# Patient Record
Sex: FEMALE | Race: WHITE | NOT HISPANIC OR LATINO | Employment: UNEMPLOYED | URBAN - METROPOLITAN AREA
[De-identification: names, ages, dates, MRNs, and addresses within clinical notes are randomized per-mention and may not be internally consistent; named-entity substitution may affect disease eponyms.]

---

## 2021-05-14 ENCOUNTER — RECORDS - HEALTHEAST (OUTPATIENT)
Dept: SCHEDULING | Facility: CLINIC | Age: 51
End: 2021-05-14

## 2021-05-14 ENCOUNTER — OFFICE VISIT (OUTPATIENT)
Dept: NEUROLOGY | Facility: CLINIC | Age: 51
End: 2021-05-14
Payer: COMMERCIAL

## 2021-05-14 ENCOUNTER — RECORDS - HEALTHEAST (OUTPATIENT)
Dept: ADMINISTRATIVE | Facility: OTHER | Age: 51
End: 2021-05-14

## 2021-05-14 VITALS
HEIGHT: 62 IN | SYSTOLIC BLOOD PRESSURE: 165 MMHG | WEIGHT: 115 LBS | BODY MASS INDEX: 21.16 KG/M2 | DIASTOLIC BLOOD PRESSURE: 106 MMHG | HEART RATE: 118 BPM

## 2021-05-14 DIAGNOSIS — M54.12 CERVICAL RADICULOPATHY: Primary | ICD-10-CM

## 2021-05-14 DIAGNOSIS — M54.12 CERVICAL RADICULOPATHY: ICD-10-CM

## 2021-05-14 DIAGNOSIS — E53.8 VITAMIN B12 DEFICIENCY (NON ANEMIC): ICD-10-CM

## 2021-05-14 PROBLEM — I10 ESSENTIAL HYPERTENSION: Status: ACTIVE | Noted: 2021-01-07

## 2021-05-14 PROBLEM — Z79.899 CONTROLLED SUBSTANCE AGREEMENT SIGNED: Status: ACTIVE | Noted: 2021-05-14

## 2021-05-14 PROCEDURE — 99205 OFFICE O/P NEW HI 60 MIN: CPT | Performed by: PSYCHIATRY & NEUROLOGY

## 2021-05-14 RX ORDER — ACETAMINOPHEN AND CODEINE PHOSPHATE 120; 12 MG/5ML; MG/5ML
0.35 SOLUTION ORAL DAILY
COMMUNITY
Start: 2021-02-19

## 2021-05-14 RX ORDER — PRAMIPEXOLE DIHYDROCHLORIDE 0.12 MG/1
TABLET ORAL
COMMUNITY
Start: 2021-04-01

## 2021-05-14 RX ORDER — LISINOPRIL 10 MG/1
TABLET ORAL
COMMUNITY
Start: 2021-04-16

## 2021-05-14 RX ORDER — BUPROPION HYDROCHLORIDE 150 MG/1
150 TABLET ORAL DAILY
COMMUNITY
Start: 2021-05-06

## 2021-05-14 RX ORDER — LEVOTHYROXINE, LIOTHYRONINE 57; 13.5 UG/1; UG/1
1 TABLET ORAL DAILY
COMMUNITY
Start: 2021-04-01

## 2021-05-14 RX ORDER — DEXTROAMPHETAMINE SULFATE, DEXTROAMPHETAMINE SACCHARATE, AMPHETAMINE SULFATE AND AMPHETAMINE ASPARTATE 7.5; 7.5; 7.5; 7.5 MG/1; MG/1; MG/1; MG/1
CAPSULE, EXTENDED RELEASE ORAL
COMMUNITY
Start: 2021-04-16

## 2021-05-14 RX ORDER — FERROUS SULFATE 324(65)MG
TABLET, DELAYED RELEASE (ENTERIC COATED) ORAL
COMMUNITY

## 2021-05-14 SDOH — HEALTH STABILITY: MENTAL HEALTH: HOW OFTEN DO YOU HAVE A DRINK CONTAINING ALCOHOL?: MONTHLY OR LESS

## 2021-05-14 SDOH — HEALTH STABILITY: MENTAL HEALTH: HOW OFTEN DO YOU HAVE 6 OR MORE DRINKS ON ONE OCCASION?: NOT ASKED

## 2021-05-14 SDOH — HEALTH STABILITY: MENTAL HEALTH: HOW MANY STANDARD DRINKS CONTAINING ALCOHOL DO YOU HAVE ON A TYPICAL DAY?: 1 OR 2

## 2021-05-14 ASSESSMENT — MIFFLIN-ST. JEOR: SCORE: 1089.89

## 2021-05-14 NOTE — PROGRESS NOTES
NEUROLOGY CONSULTATION NOTE       [facility] [place]  [address] [place] [address]  Tel: [phone]  Fax: [phone]  [email or web address]     Jennifer Skaggs,  1970, MRN [id number]  PCP: Tuyet Bedoya  Date: 2021     ASSESSMENT & PLAN     Diagnosis code  1. Cervical radiculopathy     Cervical radiculopathy  50-year-old female with a recent onset of left arm weakness with inability to lift left arm above shoulder height.  She does have weakness in left C6 innervated muscle and additionally has winging of the left scapula.  I have scheduled her for MRI of the cervical spine and EMG of the left upper extremity.  She was treated with prednisone and currently is not having any pain.  Follow-up will be the day she gets EMG.    Vitamin B12 deficiency  Patient had lab work done 5 months ago and was noted to have a low vitamin B12.  She is not sure if anything was done and reports that she is taking B complex supplements.  I have recommended repeating vitamin B12, methylmalonic acid level, folic acid and homocysteine.  Follow-up will be after above tests    Thank you again for this referral, please feel free to contact me if you have any questions.    George Muir MD  [facility]  (Formerly, [facility]     REASON FOR CONSULTATION Extremity Pain        HISTORY OF PRESENT ILLNESS     We have been requested by Dr. Bedoya to evaluate Jennifer Skaggs who is a 50 year old  female for left arm weakness    Patient is a 50-year-old female with history of restless leg syndrome, hypothyroidism, hypertension, ADD who was referred for evaluation of left, weakness that started 3 weeks ago.  According to patient without any triggering event she started noticing some discomfort in her neck with radiation down into her arm.  She felt somewhat weak in her left arm and was seen in urgent care.  She was told she has her  cervical radiculopathy and treated with prednisone and a muscle relaxant that did seem to help and was told to follow-up with neurology.  Since that episode although her pain has improved, she continues to have some weakness.  She cannot identify any triggers there there is no history of any weakness in the left lower extremity.  She finds it difficult to lift her left arm above the shoulder height.  There is no history of any numbness tingling in the left arm.    Patient had some lab work done more than 5 months ago and her vitamin B12 level was low.  She is somewhat surprised to learn as no one informed her that her vitamin B12 level was low.  She does report that she takes B complex supplements at home.     PROBLEM LIST   Patient Active Problem List   Diagnosis Code     ADD (attention deficit disorder) F98.8     Essential hypertension I10     Controlled substance agreement signed [id number]     Restless legs syndrome (RLS) G25.81     Subclinical hypothyroidism E03.9         PAST MEDICAL & SURGICAL HISTORY     Past Medical History:   Patient  has no past medical history on file.    Surgical History:  She  has no past surgical history on file.     SOCIAL HISTORY     Reviewed, and she  reports that she has never smoked. She has never used smokeless tobacco. She reports current alcohol use.     FAMILY HISTORY     Reviewed, and family history includes Diabetes in her father, maternal grandfather, and mother; Hypertension in her father, maternal grandfather, maternal grandmother, and mother; Lung Cancer in her father; Mental Illness in her mother.     ALLERGIES     No Known Allergies      REVIEW OF SYSTEMS     A 12 point review of system was performed and was negative except as outlined in the history of present illness.     HOME MEDICATIONS     Current Outpatient Rx   Medication Sig Dispense Refill     ADDERALL XR 30 MG 24 hr capsule        buPROPion (WELLBUTRIN XL) 150 MG 24 hr tablet Take 150 mg by mouth daily        "cholecalciferol 50 MCG (2000 UT) tablet Take 2,000 Units by mouth       Ferrous Sulfate 324 (65 Fe) MG TBEC        lisinopril (ZESTRIL) 10 MG tablet        norethindrone (MICRONOR) 0.35 MG tablet Take 0.35 mg by mouth daily       NP THYROID 90 MG tablet Take 1 tablet by mouth daily       pramipexole (MIRAPEX) 0.125 MG tablet TAKE 1 TABLET BY MOUTH EVERYDAY AT BEDTIME       vitamin B complex with vitamin C (VITAMIN  B COMPLEX) tablet Take 1 tablet by mouth daily           PHYSICAL EXAM     Vital signs  BP (!) 165/106 (BP Location: Right arm, Patient Position: Sitting)   Pulse 118   Ht 1.575 m (5' 2\")   Wt 52.2 kg (115 lb)   BMI 21.03 kg/m      Weight:   115 lbs 0 oz    GENERAL PHYSICAL EXAM: Patient is alert and oriented x 4 in no acute distress. Neck was supple, no carotid bruits, thyromegaly, lymphadenopathy or JVD noted.   NEUROLOGICAL EXAM:  Mental Status  Patient is A&O x 4. Patient recalls 3/3 objects at 5 minutes.  Speech  Speech is clear and fluent with good repetition, comprehension, and naming both for objects and parts of an object. Written and verbal comprehension is intact.  Cranial Nerves  CN II: Visual fields are full to confrontation. Fundoscopic exam is normal with sharp discs and no vascular changes. Venous pulsations are present bilaterally. Pupils are equal and reactive to light.   CN III, IV, VI: EOMI, PERRLA  CN V: Facial sensation is intact to pinprick in all 3 divisions bilaterally. Corneal responses are intact.  CN VII: Face is symmetric with normal eye closure and smile.  CN VII: Hearing is normal to rubbing fingers  CN IX, X: Palate elevates symmetrically. Phonation is normal.  CN XI: Head turning and shoulder shrug are intact  CN XII: Tongue is midline with normal movements and no atrophy.  Motor Exam  Muscle bulk and tone are normal. No pronator drift. Strength is 5/5 bilaterally except left deltoid, biceps brachioradialis and infraspinatus are 4/5.  Patient does appear to have " winging of the left scapula  Reflexes  Reflexes 1+ in left biceps, brachioradialis rest are 2+ with downgoing toes  Sensory Exam  Decreased pinprick in left C6 distribution  Coordination  Rapid alternating movements and fine finger movements are intact. No dysmetria on FNF and HKS. Romberg negative.  Gait  Posture is normal.Tandem gait is normal. Able to walk on toes and heels.     DIAGNOSTIC STUDIES     PERTINENT RADIOLOGY  Following imaging studies were reviewed:     No recent imaging studies to review     PERTINENT LABS  Following labs were reviewed:   Ref Range & Units 5mo ago   VITAMIN B12 180 - 914 pg/mL 179Low       Ref Range & Units 5mo ago    FERRITIN 15.0 - 205.0 ng/mL 26.4     Ref Range & Units 5mo ago    T3,TOTAL 58 - 159 ng/dL 140                  Total time spent for face to face visit, reviewing labs/imaging studies, counseling and coordination of care was: 1 Hour spent on the date of the encounter doing chart review, review of outside records, review of test results, interpretation of tests, patient visit and documentation       This note was dictated using voice recognition software.  Any grammatical or context distortions are unintentional and inherent to the software.    Orders Placed This Encounter   Procedures     MR Cervical Spine w/o & w Contrast     Folate     Vitamin B12     Homocysteine     Methylmalonic Acid     EMG      New Prescriptions    No medications on file      Modified Medications    No medications on file

## 2021-05-14 NOTE — NURSING NOTE
Chief Complaint   Patient presents with     Extremity Pain     Left sided pain- arm, shoulder, loss of motion, weakness     Jaimie Velazco CMA on 5/14/2021 at 1:06 PM

## 2021-05-14 NOTE — LETTER
2021         RE: Jennifer Skaggs  [address]  [facility] [address]        Dear Colleague,    Thank you for referring your patient, Jennifer Skaggs, to the [facility] [place]. Please see a copy of my visit note below.    NEUROLOGY CONSULTATION NOTE       [facility] [place]  [address] [place] [address]  Tel: [phone]  Fax: [phone]  [email or web address]     Jennifer Skaggs,  1970, MRN [id number]  PCP: Tuyet Bedoya  Date: 2021     ASSESSMENT & PLAN     Diagnosis code  1. Cervical radiculopathy     Cervical radiculopathy  50-year-old female with a recent onset of left arm weakness with inability to lift left arm above shoulder height.  She does have weakness in left C6 innervated muscle and additionally has winging of the left scapula.  I have scheduled her for MRI of the cervical spine and EMG of the left upper extremity.  She was treated with prednisone and currently is not having any pain.  Follow-up will be the day she gets EMG.    Vitamin B12 deficiency  Patient had lab work done 5 months ago and was noted to have a low vitamin B12.  She is not sure if anything was done and reports that she is taking B complex supplements.  I have recommended repeating vitamin B12, methylmalonic acid level, folic acid and homocysteine.  Follow-up will be after above tests    Thank you again for this referral, please feel free to contact me if you have any questions.    George Muir MD  [facility]  (Former[facility], [facility]     REASON FOR CONSULTATION Extremity Pain        HISTORY OF PRESENT ILLNESS     We have been requested by Dr. Bedoya to evaluate Jennifer Skaggs who is a 50 year old  female for left arm weakness    Patient is a 50-year-old female with history of restless leg syndrome, hypothyroidism, hypertension, ADD who was referred for evaluation of left, weakness  that started 3 weeks ago.  According to patient without any triggering event she started noticing some discomfort in her neck with radiation down into her arm.  She felt somewhat weak in her left arm and was seen in urgent care.  She was told she has her cervical radiculopathy and treated with prednisone and a muscle relaxant that did seem to help and was told to follow-up with neurology.  Since that episode although her pain has improved, she continues to have some weakness.  She cannot identify any triggers there there is no history of any weakness in the left lower extremity.  She finds it difficult to lift her left arm above the shoulder height.  There is no history of any numbness tingling in the left arm.    Patient had some lab work done more than 5 months ago and her vitamin B12 level was low.  She is somewhat surprised to learn as no one informed her that her vitamin B12 level was low.  She does report that she takes B complex supplements at home.     PROBLEM LIST   Patient Active Problem List   Diagnosis Code     ADD (attention deficit disorder) F98.8     Essential hypertension I10     Controlled substance agreement signed [id number]     Restless legs syndrome (RLS) G25.81     Subclinical hypothyroidism E03.9         PAST MEDICAL & SURGICAL HISTORY     Past Medical History:   Patient  has no past medical history on file.    Surgical History:  She  has no past surgical history on file.     SOCIAL HISTORY     Reviewed, and she  reports that she has never smoked. She has never used smokeless tobacco. She reports current alcohol use.     FAMILY HISTORY     Reviewed, and family history includes Diabetes in her father, maternal grandfather, and mother; Hypertension in her father, maternal grandfather, maternal grandmother, and mother; Lung Cancer in her father; Mental Illness in her mother.     ALLERGIES     No Known Allergies      REVIEW OF SYSTEMS     A 12 point review of system was performed and was negative  "except as outlined in the history of present illness.     HOME MEDICATIONS     Current Outpatient Rx   Medication Sig Dispense Refill     ADDERALL XR 30 MG 24 hr capsule        buPROPion (WELLBUTRIN XL) 150 MG 24 hr tablet Take 150 mg by mouth daily       cholecalciferol 50 MCG (2000 UT) tablet Take 2,000 Units by mouth       Ferrous Sulfate 324 (65 Fe) MG TBEC        lisinopril (ZESTRIL) 10 MG tablet        norethindrone (MICRONOR) 0.35 MG tablet Take 0.35 mg by mouth daily       NP THYROID 90 MG tablet Take 1 tablet by mouth daily       pramipexole (MIRAPEX) 0.125 MG tablet TAKE 1 TABLET BY MOUTH EVERYDAY AT BEDTIME       vitamin B complex with vitamin C (VITAMIN  B COMPLEX) tablet Take 1 tablet by mouth daily           PHYSICAL EXAM     Vital signs  BP (!) 165/106 (BP Location: Right arm, Patient Position: Sitting)   Pulse 118   Ht 1.575 m (5' 2\")   Wt 52.2 kg (115 lb)   BMI 21.03 kg/m      Weight:   115 lbs 0 oz    GENERAL PHYSICAL EXAM: Patient is alert and oriented x 4 in no acute distress. Neck was supple, no carotid bruits, thyromegaly, lymphadenopathy or JVD noted.   NEUROLOGICAL EXAM:  Mental Status  Patient is A&O x 4. Patient recalls 3/3 objects at 5 minutes.  Speech  Speech is clear and fluent with good repetition, comprehension, and naming both for objects and parts of an object. Written and verbal comprehension is intact.  Cranial Nerves  CN II: Visual fields are full to confrontation. Fundoscopic exam is normal with sharp discs and no vascular changes. Venous pulsations are present bilaterally. Pupils are equal and reactive to light.   CN III, IV, VI: EOMI, PERRLA  CN V: Facial sensation is intact to pinprick in all 3 divisions bilaterally. Corneal responses are intact.  CN VII: Face is symmetric with normal eye closure and smile.  CN VII: Hearing is normal to rubbing fingers  CN IX, X: Palate elevates symmetrically. Phonation is normal.  CN XI: Head turning and shoulder shrug are intact  CN XII: " Tongue is midline with normal movements and no atrophy.  Motor Exam  Muscle bulk and tone are normal. No pronator drift. Strength is 5/5 bilaterally except left deltoid, biceps brachioradialis and infraspinatus are 4/5.  Patient does appear to have winging of the left scapula  Reflexes  Reflexes 1+ in left biceps, brachioradialis rest are 2+ with downgoing toes  Sensory Exam  Decreased pinprick in left C6 distribution  Coordination  Rapid alternating movements and fine finger movements are intact. No dysmetria on FNF and HKS. Romberg negative.  Gait  Posture is normal.Tandem gait is normal. Able to walk on toes and heels.     DIAGNOSTIC STUDIES     PERTINENT RADIOLOGY  Following imaging studies were reviewed:     No recent imaging studies to review     PERTINENT LABS  Following labs were reviewed:   Ref Range & Units 5mo ago   VITAMIN B12 180 - 914 pg/mL 179Low       Ref Range & Units 5mo ago    FERRITIN 15.0 - 205.0 ng/mL 26.4     Ref Range & Units 5mo ago    T3,TOTAL 58 - 159 ng/dL 140                  Total time spent for face to face visit, reviewing labs/imaging studies, counseling and coordination of care was: 1 Hour spent on the date of the encounter doing chart review, review of outside records, review of test results, interpretation of tests, patient visit and documentation       This note was dictated using voice recognition software.  Any grammatical or context distortions are unintentional and inherent to the software.    Orders Placed This Encounter   Procedures     MR Cervical Spine w/o & w Contrast     Folate     Vitamin B12     Homocysteine     Methylmalonic Acid     EMG      New Prescriptions    No medications on file      Modified Medications    No medications on file              Again, thank you for allowing me to participate in the care of your patient.        Sincerely,        George Muir MD

## 2021-05-18 ENCOUNTER — RECORDS - HEALTHEAST (OUTPATIENT)
Dept: ADMINISTRATIVE | Facility: OTHER | Age: 51
End: 2021-05-18

## 2021-05-18 ENCOUNTER — AMBULATORY - HEALTHEAST (OUTPATIENT)
Dept: LAB | Facility: CLINIC | Age: 51
End: 2021-05-18

## 2021-05-18 DIAGNOSIS — E53.8 BIOTIN-(PROPIONYL-COA-CARBOXYLASE) LIGASE DEFICIENCY: ICD-10-CM

## 2021-05-18 LAB
FASTING STATUS PATIENT QL REPORTED: YES
FOLATE SERPL-MCNC: 14.4 NG/ML
HOMOCYSTEINE PLASMA - HISTORICAL: 5 UMOL/L (ref 0–13)
VIT B12 SERPL-MCNC: 291 PG/ML (ref 213–816)

## 2021-05-20 LAB — METHYLMALONATE SERPL-SCNC: 0.15 UMOL/L (ref 0–0.4)

## 2021-05-24 ENCOUNTER — OFFICE VISIT (OUTPATIENT)
Dept: NEUROLOGY | Facility: CLINIC | Age: 51
End: 2021-05-24
Attending: PSYCHIATRY & NEUROLOGY
Payer: COMMERCIAL

## 2021-05-24 ENCOUNTER — DOCUMENTATION ONLY (OUTPATIENT)
Dept: NEUROLOGY | Facility: CLINIC | Age: 51
End: 2021-05-24

## 2021-05-24 VITALS
HEIGHT: 62 IN | HEART RATE: 107 BPM | DIASTOLIC BLOOD PRESSURE: 86 MMHG | BODY MASS INDEX: 21.16 KG/M2 | WEIGHT: 115 LBS | SYSTOLIC BLOOD PRESSURE: 128 MMHG

## 2021-05-24 DIAGNOSIS — M54.12 CERVICAL RADICULOPATHY: ICD-10-CM

## 2021-05-24 DIAGNOSIS — G56.22 CUBITAL TUNNEL SYNDROME ON LEFT: ICD-10-CM

## 2021-05-24 PROCEDURE — 95909 NRV CNDJ TST 5-6 STUDIES: CPT | Performed by: PSYCHIATRY & NEUROLOGY

## 2021-05-24 PROCEDURE — 99214 OFFICE O/P EST MOD 30 MIN: CPT | Mod: 25 | Performed by: PSYCHIATRY & NEUROLOGY

## 2021-05-24 PROCEDURE — 95886 MUSC TEST DONE W/N TEST COMP: CPT | Mod: LT | Performed by: PSYCHIATRY & NEUROLOGY

## 2021-05-24 ASSESSMENT — MIFFLIN-ST. JEOR: SCORE: 1089.89

## 2021-05-24 NOTE — LETTER
2021         RE: Jennifer Skaggs  [address]  [facility] [address]        Dear Colleague,    Thank you for referring your patient, Jennifer Skaggs, to the [facility]UROLOG[facility]IN[facility] [place]. Please see a copy of my visit note below.    NEUROLOGY FOLLOW UP VISIT  NOTE       [facility] NEUROLOGY [place]  [address] [place] [address]  Tel: [phone]  Fax: [phone]  [email or web address]     Jennifer Skaggs,  1970, MRN [id number]  PCP: Tuyet Bedoya  Date: 2021      ASSESSMENT & PLAN     Diagnosis code  1.  Cervical radiculopathy  2. Cubital tunnel syndrome on left     Left C5 and C6 radiculopathy  Pleasant 50-year-old female with history of restless leg syndrome, hypothyroidism, HTN, ADD who was evaluated for acute onset of left arm weakness and pain.  Her MRI of cervical spine shows C4-5 and C5-C6 nerve root impingement with mild central stenosis at C5-C6.  EMG confirms left C5 and C6 radiculopathy in addition to cubital tunnel syndrome.  I have recommended:    1.  Left C5 and C6 epidural injection.  She will update me in 1 month and if there is improvement I will schedule her for another epidural injection.  2.  Physical therapy consult for neck traction  3.  Follow-up in 3 months and if there is no improvement I will refer her to neurosurgery    Left cubital tunnel syndrome  Although patient does not have any symptoms, her nerve conduction and EMG study also suggests left cubital tunnel syndrome.  I have recommended avoiding resting elbow on hard surfaces and to wear elbow pads.  If symptoms do not improve in 3 months I will refer her to hand surgery in 3 months.    Vitamin B12 deficiency  Patient had lab work done 5 months ago and was noted to have vitamin B12 but she is not sure if anything was done.  Since her last visit she had repeat vitamin B12, methylmalonic acid level, folic acid and homocysteine that was normal.  No further  intervention is needed in that regard    Thank you again for this referral, please feel free to contact me if you have any questions.    George Muir MD  [facility]  (Formerly, [facility]     HISTORY OF PRESENT ILLNESS     Patient is a 50-year-old female with history of restless leg syndrome, hypothyroidism, HTN, ADD who was initially evaluated on 5/14/2021 with complaints of left arm weakness that started 3 weeks prior to her presenting to the clinic.  Patient reports that without any triggering event she started noticing discomfort in her neck with radiation into her arm.  She had some weakness and was treated with prednisone and a muscle relaxant that did not seem to help.  On my exam she had weakness in left C6 innervated muscles and she had a EMG and MRI scan done since her last visit.  MRI scan shows disc herniation at C4-C5 impinging the left C5 nerve root.  She also had chronic moderate to severe right C5-6 and moderate right C4-5 and left C5-6 nerve root impingement.  There was mild central stenosis at C5-C6.  EMG suggested left cubital tunnel syndrome and left C5 and C6 radiculopathy.  Since her last visit she reports pain has improved although she still has significant weakness.    She had some lab work done more than 6 months ago that showed a low vitamin B12 level and since her last visit here repeat B12, methylmalonic acid level, folate and homocystine level normal.     PROBLEM LIST   Patient Active Problem List   Diagnosis Code     ADD (attention deficit disorder) F98.8     Essential hypertension I10     Controlled substance agreement signed [id number]     Restless legs syndrome (RLS) G25.81     Subclinical hypothyroidism E03.9     Left C5 and C6 radiculopathy M54.12     Cubital tunnel syndrome on left G56.22         PAST MEDICAL & SURGICAL HISTORY     Past Medical History:   Patient  has no past medical history on file.    Surgical  "History:  She  has no past surgical history on file.     SOCIAL HISTORY     Reviewed, and she  reports that she has never smoked. She has never used smokeless tobacco. She reports current alcohol use.     FAMILY HISTORY     Reviewed, and family history includes Diabetes in her father, maternal grandfather, and mother; Hypertension in her father, maternal grandfather, maternal grandmother, and mother; Lung Cancer in her father; Mental Illness in her mother.     ALLERGIES     No Known Allergies      REVIEW OF SYSTEMS     A 12 point review of system was performed and was negative except as outlined in the history of present illness.     HOME MEDICATIONS     Current Outpatient Rx   Medication Sig Dispense Refill     ADDERALL XR 30 MG 24 hr capsule        buPROPion (WELLBUTRIN XL) 150 MG 24 hr tablet Take 150 mg by mouth daily       cholecalciferol 50 MCG (2000 UT) tablet Take 2,000 Units by mouth       Ferrous Sulfate 324 (65 Fe) MG TBEC        lisinopril (ZESTRIL) 10 MG tablet        norethindrone (MICRONOR) 0.35 MG tablet Take 0.35 mg by mouth daily       NP THYROID 90 MG tablet Take 1 tablet by mouth daily       pramipexole (MIRAPEX) 0.125 MG tablet TAKE 1 TABLET BY MOUTH EVERYDAY AT BEDTIME       vitamin B complex with vitamin C (VITAMIN  B COMPLEX) tablet Take 1 tablet by mouth daily           PHYSICAL EXAM     Vital signs  /86 (BP Location: Left arm, Patient Position: Sitting)   Pulse 107   Ht 1.575 m (5' 2\")   Wt 52.2 kg (115 lb)   BMI 21.03 kg/m      Weight:   115 lbs 0 oz    GENERAL PHYSICAL EXAM: Patient is alert and oriented x 4 in no acute distress. Neck was supple, no carotid bruits, thyromegaly, lymphadenopathy or JVD noted.   NEUROLOGICAL EXAM:  Mental Status  Patient is A&O x 4. Patient recalls 3/3 objects at 5 minutes.  Speech  Speech is clear and fluent with good repetition, comprehension, and naming both for objects and parts of an object. Written and verbal comprehension is intact.  Cranial " Nerves  CN II: Visual fields are full to confrontation. Fundoscopic exam is normal with sharp discs and no vascular changes. Venous pulsations are present bilaterally. Pupils are equal and reactive to light.   CN III, IV, VI: EOMI, PERRLA  CN V: Facial sensation is intact to pinprick in all 3 divisions bilaterally. Corneal responses are intact.  CN VII: Face is symmetric with normal eye closure and smile.  CN VII: Hearing is normal to rubbing fingers  CN IX, X: Palate elevates symmetrically. Phonation is normal.  CN XI: Head turning and shoulder shrug are intact  CN XII: Tongue is midline with normal movements and no atrophy.  Motor Exam  Muscle bulk and tone are normal. No pronator drift. Strength is 5/5 bilaterally except left deltoid, biceps brachioradialis and infraspinatus are 4/5.  Patient does appear to have winging of the left scapula  Reflexes  Reflexes 1+ in left biceps, brachioradialis rest are 2+ with downgoing toes  Sensory Exam  Decreased pinprick in left C6 distribution  Coordination  Rapid alternating movements and fine finger movements are intact. No dysmetria on FNF and HKS. Romberg negative.  Gait  Posture is normal.Tandem gait is normal. Able to walk on toes and heels.     DIAGNOSTIC STUDIES     PERTINENT RADIOLOGY  Following imaging studies were reviewed:     MRI CERVICAL SPINE 5/17/2021  Multilevel cervical spondylosis with the following findings:     1. C4-5 left foraminal disc protrusion impinges the left C5 nerve root.     2. Chronic moderate to severe right C5-6 foraminal stenosis, also moderate right C4-5 and left C5-6.     3. Multilevel disc bulging with C5-6 mild central stenosis and ventral cord flattening, also ventral cord flattening at C3-4 and C4-5.    EMG 5/24/2020  This is a abnormal nerve conduction and EMG study of left upper extremity that suggest:  1. Left ulnar entrapment at the elbow (cubital tunnel syndrome)  2. Left C5 and C6 radiculopathy     PERTINENT LABS  Following  labs were reviewed:   Ref Range & Units 05/18/21   Vitamin B-12 213 - 816 pg/mL 291       Ref Range & Units 05/18/21   MMA Serum/Plasma, Vitamin B12 Status 0.00 - 0.40 umol/L 0.15      Ref Range & Units  05/18/21   Folate >=3.5 ng/mL 14.4       Ref Range & Units 5/18/2021   Homocysteine 0 - 13 umol/L 5                  Total time spent for face to face visit, reviewing labs/imaging studies, counseling and coordination of care was: 30 Minutes spent on the date of the encounter doing chart review, review of outside records, review of test results, interpretation of tests, patient visit and documentation       This note was dictated using voice recognition software.  Any grammatical or context distortions are unintentional and inherent to the software.    Orders Placed This Encounter   Procedures     XR Cervical/Thoracic Epidural Inj Incl Imaging     PHYSICAL THERAPY REFERRAL      New Prescriptions    No medications on file     Modified Medications    No medications on file                     Again, thank you for allowing me to participate in the care of your patient.        Sincerely,        George Muir MD

## 2021-05-24 NOTE — PROGRESS NOTES
NEUROLOGY FOLLOW UP VISIT  NOTE       [facility] NEUROLOGY [place]  [address] [place] [address]  Tel: [phone]  Fax: [phone]  [email or web address]     Jennifer Skaggs,  1970, MRN [id number]  PCP: Tuyet Bedoya  Date: 2021      ASSESSMENT & PLAN     Diagnosis code  1.  Cervical radiculopathy  2. Cubital tunnel syndrome on left     Left C5 and C6 radiculopathy  Pleasant 50-year-old female with history of restless leg syndrome, hypothyroidism, HTN, ADD who was evaluated for acute onset of left arm weakness and pain.  Her MRI of cervical spine shows C4-5 and C5-C6 nerve root impingement with mild central stenosis at C5-C6.  EMG confirms left C5 and C6 radiculopathy in addition to cubital tunnel syndrome.  I have recommended:    1.  Left C5 and C6 epidural injection.  She will update me in 1 month and if there is improvement I will schedule her for another epidural injection.  2.  Physical therapy consult for neck traction  3.  Follow-up in 3 months and if there is no improvement I will refer her to neurosurgery    Left cubital tunnel syndrome  Although patient does not have any symptoms, her nerve conduction and EMG study also suggests left cubital tunnel syndrome.  I have recommended avoiding resting elbow on hard surfaces and to wear elbow pads.  If symptoms do not improve in 3 months I will refer her to hand surgery in 3 months.    Vitamin B12 deficiency  Patient had lab work done 5 months ago and was noted to have vitamin B12 but she is not sure if anything was done.  Since her last visit she had repeat vitamin B12, methylmalonic acid level, folic acid and homocysteine that was normal.  No further intervention is needed in that regard    Thank you again for this referral, please feel free to contact me if you have any questions.    George Muir MD  [facility]  (Formerly, [facility]     HISTORY OF  PRESENT ILLNESS     Patient is a 50-year-old female with history of restless leg syndrome, hypothyroidism, HTN, ADD who was initially evaluated on 5/14/2021 with complaints of left arm weakness that started 3 weeks prior to her presenting to the clinic.  Patient reports that without any triggering event she started noticing discomfort in her neck with radiation into her arm.  She had some weakness and was treated with prednisone and a muscle relaxant that did not seem to help.  On my exam she had weakness in left C6 innervated muscles and she had a EMG and MRI scan done since her last visit.  MRI scan shows disc herniation at C4-C5 impinging the left C5 nerve root.  She also had chronic moderate to severe right C5-6 and moderate right C4-5 and left C5-6 nerve root impingement.  There was mild central stenosis at C5-C6.  EMG suggested left cubital tunnel syndrome and left C5 and C6 radiculopathy.  Since her last visit she reports pain has improved although she still has significant weakness.    She had some lab work done more than 6 months ago that showed a low vitamin B12 level and since her last visit here repeat B12, methylmalonic acid level, folate and homocystine level normal.     PROBLEM LIST   Patient Active Problem List   Diagnosis Code     ADD (attention deficit disorder) F98.8     Essential hypertension I10     Controlled substance agreement signed [id number]     Restless legs syndrome (RLS) G25.81     Subclinical hypothyroidism E03.9     Left C5 and C6 radiculopathy M54.12     Cubital tunnel syndrome on left G56.22         PAST MEDICAL & SURGICAL HISTORY     Past Medical History:   Patient  has no past medical history on file.    Surgical History:  She  has no past surgical history on file.     SOCIAL HISTORY     Reviewed, and she  reports that she has never smoked. She has never used smokeless tobacco. She reports current alcohol use.     FAMILY HISTORY     Reviewed, and family history includes Diabetes in  "her father, maternal grandfather, and mother; Hypertension in her father, maternal grandfather, maternal grandmother, and mother; Lung Cancer in her father; Mental Illness in her mother.     ALLERGIES     No Known Allergies      REVIEW OF SYSTEMS     A 12 point review of system was performed and was negative except as outlined in the history of present illness.     HOME MEDICATIONS     Current Outpatient Rx   Medication Sig Dispense Refill     ADDERALL XR 30 MG 24 hr capsule        buPROPion (WELLBUTRIN XL) 150 MG 24 hr tablet Take 150 mg by mouth daily       cholecalciferol 50 MCG (2000 UT) tablet Take 2,000 Units by mouth       Ferrous Sulfate 324 (65 Fe) MG TBEC        lisinopril (ZESTRIL) 10 MG tablet        norethindrone (MICRONOR) 0.35 MG tablet Take 0.35 mg by mouth daily       NP THYROID 90 MG tablet Take 1 tablet by mouth daily       pramipexole (MIRAPEX) 0.125 MG tablet TAKE 1 TABLET BY MOUTH EVERYDAY AT BEDTIME       vitamin B complex with vitamin C (VITAMIN  B COMPLEX) tablet Take 1 tablet by mouth daily           PHYSICAL EXAM     Vital signs  /86 (BP Location: Left arm, Patient Position: Sitting)   Pulse 107   Ht 1.575 m (5' 2\")   Wt 52.2 kg (115 lb)   BMI 21.03 kg/m      Weight:   115 lbs 0 oz    GENERAL PHYSICAL EXAM: Patient is alert and oriented x 4 in no acute distress. Neck was supple, no carotid bruits, thyromegaly, lymphadenopathy or JVD noted.   NEUROLOGICAL EXAM:  Mental Status  Patient is A&O x 4. Patient recalls 3/3 objects at 5 minutes.  Speech  Speech is clear and fluent with good repetition, comprehension, and naming both for objects and parts of an object. Written and verbal comprehension is intact.  Cranial Nerves  CN II: Visual fields are full to confrontation. Fundoscopic exam is normal with sharp discs and no vascular changes. Venous pulsations are present bilaterally. Pupils are equal and reactive to light.   CN III, IV, VI: EOMI, PERRLA  CN V: Facial sensation is intact " to pinprick in all 3 divisions bilaterally. Corneal responses are intact.  CN VII: Face is symmetric with normal eye closure and smile.  CN VII: Hearing is normal to rubbing fingers  CN IX, X: Palate elevates symmetrically. Phonation is normal.  CN XI: Head turning and shoulder shrug are intact  CN XII: Tongue is midline with normal movements and no atrophy.  Motor Exam  Muscle bulk and tone are normal. No pronator drift. Strength is 5/5 bilaterally except left deltoid, biceps brachioradialis and infraspinatus are 4/5.  Patient does appear to have winging of the left scapula  Reflexes  Reflexes 1+ in left biceps, brachioradialis rest are 2+ with downgoing toes  Sensory Exam  Decreased pinprick in left C6 distribution  Coordination  Rapid alternating movements and fine finger movements are intact. No dysmetria on FNF and HKS. Romberg negative.  Gait  Posture is normal.Tandem gait is normal. Able to walk on toes and heels.     DIAGNOSTIC STUDIES     PERTINENT RADIOLOGY  Following imaging studies were reviewed:     MRI CERVICAL SPINE 5/17/2021  Multilevel cervical spondylosis with the following findings:     1. C4-5 left foraminal disc protrusion impinges the left C5 nerve root.     2. Chronic moderate to severe right C5-6 foraminal stenosis, also moderate right C4-5 and left C5-6.     3. Multilevel disc bulging with C5-6 mild central stenosis and ventral cord flattening, also ventral cord flattening at C3-4 and C4-5.    EMG 5/24/2020  This is a abnormal nerve conduction and EMG study of left upper extremity that suggest:  1. Left ulnar entrapment at the elbow (cubital tunnel syndrome)  2. Left C5 and C6 radiculopathy     PERTINENT LABS  Following labs were reviewed:   Ref Range & Units 05/18/21   Vitamin B-12 213 - 816 pg/mL 291       Ref Range & Units 05/18/21   MMA Serum/Plasma, Vitamin B12 Status 0.00 - 0.40 umol/L 0.15      Ref Range & Units  05/18/21   Folate >=3.5 ng/mL 14.4       Ref Range & Units 5/18/2021    Homocysteine 0 - 13 umol/L 5                  Total time spent for face to face visit, reviewing labs/imaging studies, counseling and coordination of care was: 30 Minutes spent on the date of the encounter doing chart review, review of outside records, review of test results, interpretation of tests, patient visit and documentation       This note was dictated using voice recognition software.  Any grammatical or context distortions are unintentional and inherent to the software.    Orders Placed This Encounter   Procedures     XR Cervical/Thoracic Epidural Inj Incl Imaging     PHYSICAL THERAPY REFERRAL      New Prescriptions    No medications on file     Modified Medications    No medications on file

## 2021-05-24 NOTE — LETTER
5/24/2021         RE: Jennifer Skaggs  [address] [facility] [address]        Dear Colleague,    Thank you for referring your patient, Jennifer Skaggs, to the [facility]UROLOGY [facility]IC [place]. Please see a copy of my visit note below.    See procedure note and EMG report      Again, thank you for allowing me to participate in the care of your patient.        Sincerely,        George Muir MD

## 2021-05-24 NOTE — PROCEDURES
ELECTROMYOGRAPHY (EMG) REPORT       [facility] NEUROLOGY [place] [address] [place] [address]  Tel: [phone]  Fax: [phone]  [email or web address]     Jennifer Skaggs,  1970, MRN [id number]  PCP: Tuyet Bedoya  Date: 2021     Principal Diagnosis: Cervical radiculopathy     Height: 5 feet 2 inch  Reason for referral: Evaluate left upper. c/o pain, weakness in left shoulder/arm for a month.      Motor NCS      Nerve / Sites Lat Amp Dist Chintan    ms mV cm m/s   L Median - APB      Wrist 3.07 14.4 7       Elbow 6.72 14.2 21 58   L Ulnar - ADM      Wrist 2.76 12.3 7       B.Elbow 4.74 12.0 16 81      A.Elbow 6.93 11.9 10 46       F  Wave      Nerve Fmin    ms   L Ulnar - ADM 24.79       Sensory NCS      Nerve / Sites Onset Lat Pk Lat Amp.2-3 Dist Chintan Lat Diff    ms ms  V cm m/s ms   L Median - II (Antidr)      Wrist 2.55 3.18 78.9 13 51    L Ulnar - V (Antidr)      Wrist 2.19 3.18 32.5 11 50    L Median, Ulnar - Transcarpal comparison      Median Palm 1.56 2.19 65.2 8 51       Ulnar Palm 1.35 1.88 51.0 8 59          0.31       EMG Summary Table     Spontaneous MUAP Rcmt Note   Muscle Fib PSW Fasc IA # Amp Dur PPP Rate Type   L. Brachioradialis 1+ 1+ None N Sl Red Incr Incr N N N   L. Biceps brachii 1+ 1+ None N Sl Red Incr Incr N N N   L. Deltoid 2+ 2+ None N Sl Mod Red Incr Incr N N N   L. Infraspinatus 2+ 2+ None N Sl Mod Red Incr Incr N N N   L. Supraspinatus 2+ 2+ None N Sl Mod Red Incr Incr N N N   L. Pronator teres None None None N N N N N N N   L. Triceps brachii None None None N N N N N N N   L. Extensor digitorum communis None None None N N N N N N N   L. Abductor digiti minimi (manus) None None None N N N N N N N   L. First dorsal interosseous None None None N N N N N N N   L. Flexor carpi ulnaris None None None N N N N N N N   L. Abductor pollicis brevis None None None N N N N N N N   L. Serratus anterior 1+ 1+ None N Sl Mod Red Incr Incr N N N        Summary:  Nerve conduction and EMG study of left upper extremity shows:  1. Normal left median distal motor latency, amplitude and conduction velocity  2. Normal left ulnar distal motor latency with slow conduction across the elbow  3. Normal left ulnar F latency  4. Normal left median and ulnar SNAP  5. Disposable, monopolar needle showed changes as above    Impression:   This is a abnormal nerve conduction and EMG study of left upper extremity that suggest:  1. Left ulnar entrapment at the elbow (cubital tunnel syndrome)  2. Left C5 and C6 radiculopathy      George Muir MD  [facility]  (Formerly, [facility]      This note was dictated using voice recognition software.  Any grammatical or context distortions are unintentional and inherent to the software.

## 2021-05-24 NOTE — NURSING NOTE
Chief Complaint   Patient presents with     Cervical radiculopathy     Discuss EMG and MRI results      Jaimie Velazco CMA on 5/24/2021 at 11:31 AM

## 2021-05-24 NOTE — NURSING NOTE
Patient: Jennifer Skaggs  AMANB: 1970  Sex: Female  Phone: [phone]     [facility] MRN: [id number]  Exam Date: 05/17/2021     EXAM: MR CERVICAL SPINE WITHOUT AND WITH CONTRAST     CLINICAL INFORMATION: Left-sided neck pain and left upper extremity pain.     TECHNICAL INFORMATION: T1 FLAIR, T2 FSE and STIR sagittal thin sections through the cervical spine with T1 and T2 FSE and GRE axial sections at selected levels. Following the intravenous administration of 10 mL gadoterate meglumine, T1-weighted fat-suppressed sagittal and T1-weighted axial images were acquired.     INTERPRETATION: Partially visualized intracranial structures appear normal. No Chiari malformation. Normal signal in the cervical spinal cord. No abnormal enhancing lesions. Mild upper cervical lordotic reversal. Vertebral body heights are maintained. Normal atlantodental and atlantooccipital articulations. Paraspinal soft tissues appear normal.     C2-3: Normal posterior disc contour, normal facet joints. No central or foraminal stenosis.     C3-4: Mild disc degeneration, minimal central disc protrusion/bulge mildly flattening the ventral cord, uncovertebral spurring, normal facet joints. No central or foraminal stenosis.     C4-5: Moderate disc degeneration and trace retrolisthesis, disc bulge flattening the ventral cord with 3 mm left foraminal disc protrusion impinging the left C5 nerve root, uncovertebral spurring, normal facet joints. Mild central stenosis. Moderate right foraminal stenosis.     C5-6: Moderate disc degeneration, disc bulge flattening the ventral cord, uncovertebral spurring, normal facet joints. Mild central stenosis. Moderate to severe right and moderate left foraminal stenosis.     C6-7: Minimal disc bulge. No central or foraminal stenosis.     C7-T1, T1-2, T2-3: No central or foraminal stenosis.     CONCLUSION: Multilevel cervical spondylosis with the following findings:     1. C4-5 left foraminal disc protrusion  impinges the left C5 nerve root.     2. Chronic moderate to severe right C5-6 foraminal stenosis, also moderate right C4-5 and left C5-6.     3. Multilevel disc bulging with C5-6 mild central stenosis and ventral cord flattening, also ventral cord flattening at C3-4 and C4-5.           Electronically signed on 5/17/2021 10:45:00 PM by Ralph Menon M.D.

## 2021-05-25 ENCOUNTER — TELEPHONE (OUTPATIENT)
Dept: NEUROLOGY | Facility: CLINIC | Age: 51
End: 2021-05-25

## 2021-05-25 NOTE — TELEPHONE ENCOUNTER
Do you want interlaminar injection where they inject at C7-T1 and push the medication up to C4-C5 and C5-C6  OR   2 separate transforaminal epidurals at C4-C5 & C5-C6?  Jaimie Velazco CMA on 5/25/2021 at 12:09 PM

## 2021-05-25 NOTE — TELEPHONE ENCOUNTER
Katerina from [facility] call-requesting a call back, they need clarification on the injection order for pt. [phone] option 1.

## 2021-06-27 ENCOUNTER — HEALTH MAINTENANCE LETTER (OUTPATIENT)
Age: 51
End: 2021-06-27

## 2021-10-17 ENCOUNTER — HEALTH MAINTENANCE LETTER (OUTPATIENT)
Age: 51
End: 2021-10-17

## 2022-07-23 ENCOUNTER — HEALTH MAINTENANCE LETTER (OUTPATIENT)
Age: 52
End: 2022-07-23

## 2022-10-01 ENCOUNTER — HEALTH MAINTENANCE LETTER (OUTPATIENT)
Age: 52
End: 2022-10-01

## 2023-08-06 ENCOUNTER — HEALTH MAINTENANCE LETTER (OUTPATIENT)
Age: 53
End: 2023-08-06

## 2025-01-04 NOTE — PROGRESS NOTES
Patient: Jennifer Skaggs  AMANB: 1970  Sex: Female  Phone: [phone]    [facility] MRN: [id number]  Exam Date: 05/17/2021     EXAM: MR CERVICAL SPINE WITHOUT AND WITH CONTRAST    CLINICAL INFORMATION: Left-sided neck pain and left upper extremity pain.    TECHNICAL INFORMATION: T1 FLAIR, T2 FSE and STIR sagittal thin sections through the cervical spine with T1 and T2 FSE and GRE axial sections at selected levels. Following the intravenous administration of 10 mL gadoterate meglumine, T1-weighted fat-suppressed sagittal and T1-weighted axial images were acquired.    INTERPRETATION: Partially visualized intracranial structures appear normal. No Chiari malformation. Normal signal in the cervical spinal cord. No abnormal enhancing lesions. Mild upper cervical lordotic reversal. Vertebral body heights are maintained. Normal atlantodental and atlantooccipital articulations. Paraspinal soft tissues appear normal.    C2-3: Normal posterior disc contour, normal facet joints. No central or foraminal stenosis.    C3-4: Mild disc degeneration, minimal central disc protrusion/bulge mildly flattening the ventral cord, uncovertebral spurring, normal facet joints. No central or foraminal stenosis.    C4-5: Moderate disc degeneration and trace retrolisthesis, disc bulge flattening the ventral cord with 3 mm left foraminal disc protrusion impinging the left C5 nerve root, uncovertebral spurring, normal facet joints. Mild central stenosis. Moderate right foraminal stenosis.    C5-6: Moderate disc degeneration, disc bulge flattening the ventral cord, uncovertebral spurring, normal facet joints. Mild central stenosis. Moderate to severe right and moderate left foraminal stenosis.    C6-7: Minimal disc bulge. No central or foraminal stenosis.    C7-T1, T1-2, T2-3: No central or foraminal stenosis.    CONCLUSION: Multilevel cervical spondylosis with the following findings:    1. C4-5 left foraminal disc protrusion impinges the  left C5 nerve root.    2. Chronic moderate to severe right C5-6 foraminal stenosis, also moderate right C4-5 and left C5-6.    3. Multilevel disc bulging with C5-6 mild central stenosis and ventral cord flattening, also ventral cord flattening at C3-4 and C4-5.        Electronically signed on 5/17/2021 10:45:00 PM by Ralph Menon M.D.   no concerns